# Patient Record
Sex: FEMALE | Race: WHITE | NOT HISPANIC OR LATINO | Employment: OTHER | URBAN - METROPOLITAN AREA
[De-identification: names, ages, dates, MRNs, and addresses within clinical notes are randomized per-mention and may not be internally consistent; named-entity substitution may affect disease eponyms.]

---

## 2017-05-12 ENCOUNTER — OFFICE VISIT - HEALTHEAST (OUTPATIENT)
Dept: CARDIOLOGY | Facility: CLINIC | Age: 82
End: 2017-05-12

## 2017-05-12 ENCOUNTER — TRANSFERRED RECORDS (OUTPATIENT)
Dept: HEALTH INFORMATION MANAGEMENT | Facility: HOSPITAL | Age: 82
End: 2017-05-12

## 2017-05-12 DIAGNOSIS — I47.10 SVT (SUPRAVENTRICULAR TACHYCARDIA) (H): ICD-10-CM

## 2017-05-12 ASSESSMENT — MIFFLIN-ST. JEOR: SCORE: 1236.94

## 2021-05-30 VITALS — BODY MASS INDEX: 25.01 KG/M2 | WEIGHT: 165 LBS | HEIGHT: 68 IN

## 2021-06-03 ENCOUNTER — RECORDS - HEALTHEAST (OUTPATIENT)
Dept: ADMINISTRATIVE | Facility: CLINIC | Age: 86
End: 2021-06-03

## 2021-06-25 NOTE — PROGRESS NOTES
Progress Notes by Henry Meza MD at 5/12/2017 12:50 PM     Author: Henry Meza MD Service: -- Author Type: Physician    Filed: 5/12/2017  1:07 PM Encounter Date: 5/12/2017 Status: Signed    : Henry Meza MD (Physician)           Click to link to [facility] [place] HEART CARE NOTE    Thank you, Dr. Toussaint, for asking us to see Yenny Altamirano at the [facility].      Assessment/Recommendations   She with known history of SVT who is doing well from a cardiovascular standpoint.  She also has a history of hypertension and lisinopril was added to her medical regimen and she is doing very well and her blood pressures excellent today.    I have not scheduled her for routine follow-up.  She can see us on an as-needed basis.    I have encouraged her to be active and to get protection from the sun through clothing or umbrella so that she can be outside a bit more and test this with her skin.         History of Present Illness    Ms. Yenny Altamirano is a 82 y.o. female with a history of supraventricular tachycardia.  She did have an ablation and she is really not had much trouble with supraventricular tachycardia or palpitations since that time.  This past year is been good with the exception of her skin disease.  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    She denies orthopnea, paroxysmal nocturnal dyspnea, and has minimal peripheral edema.  She has not had syncopal or near syncopal episodes.  She denies chest pain.    She is not physically active.  She avoids going outside because of the sun as her skin is very sun sensitive.  Being out in the sun can cause her skin to blister.  She has some protection but does not sound like she uses big hats or other kinds of production very often.             Physical Examination Review of Systems   Vitals:    05/12/17 1249   BP: 108/68   Pulse: 76   Resp: 16     Body mass index is 25.09 kg/(m^2).  Wt  Readings from Last 3 Encounters:   05/12/17 165 lb (74.8 kg)   04/29/16 164 lb 3.2 oz (74.5 kg)     General Appearance:   Alert, cooperative and in no acute distress.   ENT/Mouth: Oral mucuos membranes pink and moist .      EYES:  No scleral icterus. No Xanthelasma.    Neck: JVP normal. No Hepatojugular reflux. Thyroid not visualized   Chest/Lungs:   Lungs are clear to auscultation, equal chest wall expansion    Cardiovascular:   S1, S2 without murmur ,clicks or rubs. Brachial, radial and posterior tibial pulses are intact and symetric. No carotid bruits noted   Abdomen:  Nontender. BS+.       Extremities: No cyanosis, clubbing or edema   Skin: no xanthelasma, warm.  Round erythematous areas on abdomen.     Psych: Appropriate affect.   Neurologic: normal gait, normal  bilateral, no tremors        General: WNL  Eyes: WNL  Ears/Nose/Throat: WNL  Lungs: WNL  Heart: WNL  Stomach: WNL  Bladder: WNL  Muscle/Joints: WNL  Skin: WNL  Nervous System: WNL  Mental Health: WNL     Blood: WNL     Medical History  Surgical History Family History Social History   No past medical history on file. Past Surgical History:   Procedure Laterality Date   ? MS ABLATE HEART DYSRHYTHM FOCUS      Description: Catheter Ablation Atrial Tachycardia;  Proc Date: 09/28/2011;    No family history on file. Social History     Social History   ? Marital status:      Spouse name: N/A   ? Number of children: N/A   ? Years of education: N/A     Occupational History   ? Not on file.     Social History Main Topics   ? Smoking status: Former Smoker   ? Smokeless tobacco: Not on file      Comment: 10+ years ago   ? Alcohol use Not on file   ? Drug use: Not on file   ? Sexual activity: Not on file     Other Topics Concern   ? Not on file     Social History Narrative          Medications  Allergies   Current Outpatient Prescriptions   Medication Sig Dispense Refill   ? cholecalciferol, vitamin D3, (VITAMIN D3) 1,000 unit capsule Take 1,000 Units by  mouth daily.     ? levothyroxine (SYNTHROID, LEVOTHROID) 75 MCG tablet 1 tablet daily.     ? lisinopril (PRINIVIL,ZESTRIL) 5 MG tablet Take 1 tablet by mouth daily.  3   ? metoprolol succinate (TOPROL-XL) 25 MG 1 tablet daily.     ? ascorbic acid (ASCORBIC ACID WITH VEGA HIPS) 500 MG tablet Take 500 mg by mouth daily.     ? aspirin 81 MG EC tablet Take 81 mg by mouth daily.     ? diphenhydrAMINE-acetaminophen (TYLENOL PM EXTRA STRENGTH)  mg Tab Take 2 tablets by mouth bedtime as needed.     ? fluocinonide (LIDEX) 0.05 % cream 2 (two) times a day as needed.     ? triamcinolone (KENALOG) 0.1 % ointment as needed.       No current facility-administered medications for this visit.       Allergies   Allergen Reactions   ? Atorvastatin          Lab Results    Chemistry/lipid CBC Cardiac Enzymes/BNP/TSH/INR   Lab Results   Component Value Date    CHOL 176 09/25/2011    HDL 44 09/25/2011    LDLCALC 114 09/25/2011    TRIG 87 09/25/2011    CREATININE 0.99 09/25/2011    BUN 15 09/25/2011    K 4.0 09/25/2011     09/25/2011     (H) 09/25/2011    CO2 21 (L) 09/25/2011    Lab Results   Component Value Date    WBC 6.1 09/24/2011    HGB 14.0 09/24/2011    HCT 40.1 09/24/2011    MCV 96 09/24/2011     09/24/2011    Lab Results   Component Value Date    TROPONINI 0.03 09/24/2011    TSH 0.8 09/24/2011    INR 0.96 09/24/2011